# Patient Record
Sex: FEMALE | Race: OTHER | URBAN - METROPOLITAN AREA
[De-identification: names, ages, dates, MRNs, and addresses within clinical notes are randomized per-mention and may not be internally consistent; named-entity substitution may affect disease eponyms.]

---

## 2022-07-05 ENCOUNTER — VIRTUAL VISIT (OUTPATIENT)
Dept: FAMILY MEDICINE CLINIC | Age: 52
End: 2022-07-05

## 2022-07-05 DIAGNOSIS — M54.50 ACUTE RIGHT-SIDED LOW BACK PAIN WITHOUT SCIATICA: Primary | ICD-10-CM

## 2022-07-05 PROCEDURE — 99203 OFFICE O/P NEW LOW 30 MIN: CPT | Performed by: FAMILY MEDICINE

## 2022-07-05 RX ORDER — AMITRIPTYLINE HYDROCHLORIDE 10 MG/1
10 TABLET, FILM COATED ORAL
Qty: 30 TABLET | Refills: 0 | Status: SHIPPED | OUTPATIENT
Start: 2022-07-05 | End: 2022-07-05 | Stop reason: SDUPTHER

## 2022-07-05 RX ORDER — IBUPROFEN 200 MG
200 TABLET ORAL
COMMUNITY

## 2022-07-05 RX ORDER — AMITRIPTYLINE HYDROCHLORIDE 10 MG/1
10 TABLET, FILM COATED ORAL
Qty: 30 TABLET | Refills: 0 | Status: SHIPPED | OUTPATIENT
Start: 2022-07-05

## 2022-07-05 RX ORDER — FAMCICLOVIR 500 MG/1
500 TABLET ORAL 3 TIMES DAILY
Qty: 21 TABLET | Refills: 0 | Status: SHIPPED | OUTPATIENT
Start: 2022-07-05 | End: 2022-07-12

## 2022-07-05 RX ORDER — OMEPRAZOLE 40 MG/1
40 CAPSULE, DELAYED RELEASE ORAL DAILY
COMMUNITY

## 2022-07-05 RX ORDER — FAMCICLOVIR 500 MG/1
500 TABLET ORAL 3 TIMES DAILY
Qty: 21 TABLET | Refills: 0 | Status: SHIPPED | OUTPATIENT
Start: 2022-07-05 | End: 2022-07-05 | Stop reason: SDUPTHER

## 2022-07-05 RX ORDER — ACETAMINOPHEN 500 MG
500 TABLET ORAL
COMMUNITY

## 2022-07-05 NOTE — PROGRESS NOTES
Mookie Webster (: 1970) is a 45 y.o. female, new patient, Virtual Visit for evaluation of the following chief complaint(s):   Headache (x 4 days. Pt states she is taking aspirin x 3 days but she doesn't remeberg the mg) and Flank Pain (pt c/o right flank pain radiating to her righ leg x 5 days)       ASSESSMENT/PLAN:  1. Acute right-sided low back pain without sciatica  With blisters but difficult to examine due to video quality. Dermatome would follow L3-4 and be concerning for shingles. Will start Famvir and Elavil and recheck F2F. Return for follow up for F2F next available for Rt back and thigh pain. SUBJECTIVE/OBJECTIVE:  HPI  Doxy. me visit assisted by son. Headache: x 4 days. Rt flank pain: started with Rt back/flank sensitivity. Radiating to Rt anterior thigh now. It is a burning sensation in the skin. Today noted small blisters/bites on anterior thigh. PMHx: gastritis    Review of Systems   Constitutional: Negative for fever. Eyes: Negative for visual disturbance. Respiratory: Negative for cough. Neurological: Negative for weakness. Hematological: Does not bruise/bleed easily. Patient-Reported Systolic (Top): 739  Patient-Reported Diastolic (Bottom): 70  Patient-Reported Weight: 130 lb  Patient-Reported LMP: Hysterectomy    Physical Exam  CONSTITUTIONAL:  Well developed. No apparent distress. PSYCHIATRIC: Oriented to time, place, person & situation. Appropriate mood and affect. RESPIRATORY:  Normal effort. Normal ascultation without wheezing. SKIN:  Back without any rash. Tender along Rt lower lumbar but not buttock. Sensitive in Rt anterior thigh with 2 small blisters in mid anterior thigh. Mookie Webster, was evaluated through a synchronous (real-time) audio-video encounter. The patient (or guardian if applicable) is aware that this is a billable service, which includes applicable co-pays.  This Virtual Visit was conducted with patient's (and/or legal guardian's) consent. The visit was conducted pursuant to the emergency declaration under the [address] [place] [address] and the "[employer] and [employer] General Act. Patient identification was verified, and a caregiver was present when appropriate. The patient was located at: Home: [address] [place] [id number]  The provider was located at: Home:      Patient identification was verified at the start of the visit: YES    Services were provided through a video synchronous discussion virtually to substitute for in-person clinic visit. Patient was located at home and provider was located in office or at home. An electronic signature was used to authenticate this note.   -- Indira Chandra MD

## 2022-07-05 NOTE — PROGRESS NOTES
Tc to the pt for discharge with [facility] Int # [id number]. The pt verified her name and . The medications were reviewed with the pt they were going to be more expensive at the [facility] than a [address] so the pt requested the rx's be sent to the [address] on [address] [employer]. The 2 medications ordered today were re-ordered to the [address] she requested.  Karolina Rees RN

## 2022-07-06 ENCOUNTER — TELEPHONE (OUTPATIENT)
Dept: FAMILY MEDICINE CLINIC | Age: 52
End: 2022-07-06

## 2022-07-06 NOTE — TELEPHONE ENCOUNTER
Tc to the pt's son [place]. He verified his name and the pt's name and . He is staing the pt had an [facility] appt yesterday. She was given 2 medications. She is continuing to have headaches pain scale 8/10. They are constant. No other sxs. except the pain in the skin. The pt denies weakness in one side of the body (UE or LE), has no blurred vision, or slurred speech. The pt took Tylenol 500 mg 2 tablets this am at 7am, and the head pain was a little better, but she continues to have it. The pt's son is requesting another mediation for the headaches. The pt was supposed to be scheduled for an appt F2F appt next available. There is not an appt noted in the pt's chart. This message was sent to the provider and the front office registrar to schedule the appt.  Titus Toure RN

## 2022-07-06 NOTE — TELEPHONE ENCOUNTER
Lisa Coates  Is Ronnie Perez   son he call main office 7/6/22  Requesting to talk to a provider or a nurse  As soon is possible since son Kiley Greene stated that his mom Ronnie Perez  Has a strong headaches and is nonstop , patient will be waiting for a phone call .     Thank you   Irena Verma

## 2022-07-07 ENCOUNTER — OFFICE VISIT (OUTPATIENT)
Dept: FAMILY MEDICINE CLINIC | Age: 52
End: 2022-07-07

## 2022-07-07 VITALS
SYSTOLIC BLOOD PRESSURE: 107 MMHG | OXYGEN SATURATION: 100 % | BODY MASS INDEX: 24.92 KG/M2 | WEIGHT: 132 LBS | HEART RATE: 69 BPM | TEMPERATURE: 97.9 F | HEIGHT: 61 IN | DIASTOLIC BLOOD PRESSURE: 62 MMHG

## 2022-07-07 DIAGNOSIS — B02.9 HERPES ZOSTER WITHOUT COMPLICATION: Primary | ICD-10-CM

## 2022-07-07 PROCEDURE — 99213 OFFICE O/P EST LOW 20 MIN: CPT | Performed by: FAMILY MEDICINE

## 2022-07-07 NOTE — PROGRESS NOTES
Mary Woods (: 1970) is a 45 y.o. female, established patient, here for evaluation of the following chief complaint(s):  Back Pain (patient c/o pain on right side of ack and radiates to the leg.) and Head Pain       ASSESSMENT/PLAN:  1. Herpes zoster without complication  Natural course and possible complications reviewed. May increase Elavil to 20mg QHS since she is not having any SE. Continue with Tylenol PRN. [place]. No limitations noted today. SUBJECTIVE:  HPI  Headache: x 4 days. Relieved with Tylenol. NO fevers, vision changes, nausea. Rt flank pain: started with Rt back/flank sensitivity. Radiating to Rt anterior thigh now with a burning sensation in the skin. Patient started with Famvir and Elavil without SE. PMHx: gastritis    Review of Systems   Constitutional: Negative for chills and fever. HENT: Negative for congestion and sore throat. Respiratory: Negative for cough and shortness of breath. Cardiovascular: Negative for chest pain. Gastrointestinal: Negative for diarrhea. Genitourinary: Negative for dysuria. Neurological: Negative for dizziness, facial asymmetry, weakness and light-headedness. OBJECTIVE:  Blood pressure 107/62, pulse 69, temperature 97.9 °F (36.6 °C), temperature source Temporal, height 5' 1.42\" (1.56 m), weight 132 lb (59.9 kg), SpO2 100 %. Physical Exam  CONSTITUTIONAL:  Well developed. No apparent distress. PSYCHIATRIC: Oriented to time, place, person & situation. Appropriate mood and affect. NECK:  Normal inspection, normal palpation without any lymphadenopathy, masses, or thyromegaly  CARDIOVASCULAR:  Regular rate and rhythm. Normal S1, S2. No extra sounds. RESPIRATORY:  Normal effort. Normal ascultation without wheezing. ABDOMEN:  Normal bowel sounds. Abdomen soft, non tender. Mild epigastric pain. No hepatosplenomegaly or masses.   VASCULAR:  Normal Carotid pulses without bruits. Normal Posterior Tibialis pulses. No abdominal or femoral bruits. EXTREMITIES:  No edema. SKIN:  Back without rash but has sensitivity to touch along Rt lower lumbar muscles. Rt anterior thigh has additional small red papules and the ones noted earlier this week are now small blisters with red base. No results found for this visit on 07/07/22. An electronic signature was used to authenticate this note.   -- Liza Grewal MD

## 2023-11-21 ENCOUNTER — TELEPHONE (OUTPATIENT)
Age: 53
End: 2023-11-21

## 2023-11-21 NOTE — TELEPHONE ENCOUNTER
Called patient to remind of upcoming appointment with [facility] program on 11/28/23- no answer. Left message with appt. Details and [facility] hotline in case that she wants to cancel/reschedule.  Renny Deleon

## 2024-04-02 ENCOUNTER — HOSPITAL ENCOUNTER (OUTPATIENT)
Facility: HOSPITAL | Age: 54
Discharge: HOME | End: 2024-04-05
Attending: FAMILY MEDICINE

## 2024-04-02 ENCOUNTER — OFFICE VISIT (OUTPATIENT)
Age: 54
End: 2024-04-02

## 2024-04-02 VITALS — HEIGHT: 61 IN | BODY MASS INDEX: 23.6 KG/M2 | WEIGHT: 125 LBS

## 2024-04-02 DIAGNOSIS — Z12.31 BREAST CANCER SCREENING BY MAMMOGRAM: Primary | ICD-10-CM

## 2024-04-02 DIAGNOSIS — Z12.31 VISIT FOR SCREENING MAMMOGRAM: ICD-10-CM

## 2024-04-02 PROCEDURE — 77063 BREAST TOMOSYNTHESIS BI: CPT

## 2024-04-02 NOTE — PROGRESS NOTES
EVERY WOMANS LIFE HISTORY QUESTIONNAIRE       No Yes Comments   Has a doctor ever seen or felt anything wrong with your breast? [x]                                  []                                     Have you ever had a breast biopsy? [x]                                  []                                          When and where was last mammogram performed?  [facility] 5/16/22    Have you ever been told that there was a problem on your mammogram?   No Yes Comments   [x]                                  []                                       Do you have breast implants?   No Yes Comments   [x]                                  []                                       When was your last Pap test performed? 12 years ago in [facility]. Pt had hysterectomy. Pt declined [facility] pelvic.    Have you ever had an abnormal Pap test?   No Yes Comments   [x]                                  []                                       Have you had a hysterectomy?   No Yes Comments (why)   []                                  [x]                                  [place]     Have you been through menopause?   No Yes Date of LMP   []                                  [x]                                  2012     Did your mother take BERNICE?  No Yes Unknown   []                                  []                                  x     Do you have a history of HIV exposure?  No Yes    [x]                                  []                                       Have you ever been diagnosed with any type of Cancer   No Yes Comments (type,when,where,type of treatment   [x]                                  []                                          Has a family member been diagnosed with breast or ovarian cancer?   No Yes Comments (which family members, and type   [x]                                  []                                       Are you taking hormone replacement therapy (HRT)     No Yes Comments   [x]